# Patient Record
Sex: FEMALE | Race: WHITE | Employment: FULL TIME | URBAN - METROPOLITAN AREA
[De-identification: names, ages, dates, MRNs, and addresses within clinical notes are randomized per-mention and may not be internally consistent; named-entity substitution may affect disease eponyms.]

---

## 2020-08-18 ENCOUNTER — OFFICE VISIT (OUTPATIENT)
Dept: INTERNAL MEDICINE CLINIC | Facility: CLINIC | Age: 45
End: 2020-08-18
Payer: COMMERCIAL

## 2020-08-18 VITALS
TEMPERATURE: 98 F | HEART RATE: 72 BPM | SYSTOLIC BLOOD PRESSURE: 124 MMHG | WEIGHT: 236 LBS | BODY MASS INDEX: 39.32 KG/M2 | RESPIRATION RATE: 18 BRPM | HEIGHT: 65 IN | DIASTOLIC BLOOD PRESSURE: 80 MMHG | OXYGEN SATURATION: 98 %

## 2020-08-18 DIAGNOSIS — Z13.220 SCREENING FOR LIPID DISORDERS: ICD-10-CM

## 2020-08-18 DIAGNOSIS — Z12.31 ENCOUNTER FOR SCREENING MAMMOGRAM FOR MALIGNANT NEOPLASM OF BREAST: ICD-10-CM

## 2020-08-18 DIAGNOSIS — L03.032 PARONYCHIA OF TOE OF LEFT FOOT: Primary | ICD-10-CM

## 2020-08-18 DIAGNOSIS — Z13.228 SCREENING FOR METABOLIC DISORDER: ICD-10-CM

## 2020-08-18 DIAGNOSIS — Z13.0 SCREENING FOR BLOOD DISEASE: ICD-10-CM

## 2020-08-18 DIAGNOSIS — Z00.00 LABORATORY EXAMINATION ORDERED AS PART OF A COMPLETE PHYSICAL EXAMINATION: ICD-10-CM

## 2020-08-18 DIAGNOSIS — L03.032 CELLULITIS OF TOE OF LEFT FOOT: ICD-10-CM

## 2020-08-18 DIAGNOSIS — Z13.29 THYROID DISORDER SCREENING: ICD-10-CM

## 2020-08-18 PROCEDURE — 99203 OFFICE O/P NEW LOW 30 MIN: CPT | Performed by: INTERNAL MEDICINE

## 2020-08-18 PROCEDURE — 3079F DIAST BP 80-89 MM HG: CPT | Performed by: INTERNAL MEDICINE

## 2020-08-18 PROCEDURE — 3074F SYST BP LT 130 MM HG: CPT | Performed by: INTERNAL MEDICINE

## 2020-08-18 PROCEDURE — 3008F BODY MASS INDEX DOCD: CPT | Performed by: INTERNAL MEDICINE

## 2020-08-18 RX ORDER — CLINDAMYCIN HYDROCHLORIDE 300 MG/1
300 CAPSULE ORAL 4 TIMES DAILY
Qty: 28 CAPSULE | Refills: 0 | Status: SHIPPED | OUTPATIENT
Start: 2020-08-18 | End: 2020-08-25

## 2020-08-18 NOTE — PROGRESS NOTES
Geraldo Valdes  12/23/1975    Patient presents with:  Establish Care: [facility] rm 6 new pt, transfer from [facility]. Left big toe infection for months      SUBJECTIVE   Geraldo Valdes is a 38year old female who presents with a painful first left toe.     The patient note Encounter for long-term (current) use of other medications     Other pulmonary embolism and infarction     Past Surgical History:   Procedure Laterality Date   •  DELIVERY ONLY  2006   • CONIZATION CERVIX,LOOP ELECTRD  2003   • FOOT/TOES SURGER capsule 0     Sig: Take 1 capsule (300 mg total) by mouth 4 (four) times daily for 7 days. Imaging & Consults:  PODIATRY - INTERNAL    No follow-ups on file. There are no Patient Instructions on file for this visit.     All questions were answered an

## 2020-09-08 ENCOUNTER — OFFICE VISIT (OUTPATIENT)
Dept: PODIATRY CLINIC | Facility: CLINIC | Age: 45
End: 2020-09-08
Payer: COMMERCIAL

## 2020-09-08 DIAGNOSIS — L03.032 PARONYCHIA OF TOE OF LEFT FOOT: Primary | ICD-10-CM

## 2020-09-08 DIAGNOSIS — M79.675 PAIN OF TOE OF LEFT FOOT: ICD-10-CM

## 2020-09-08 DIAGNOSIS — L60.0 ONYCHOCRYPTOSIS: ICD-10-CM

## 2020-09-08 PROCEDURE — 99203 OFFICE O/P NEW LOW 30 MIN: CPT | Performed by: PODIATRIST

## 2020-09-09 RX ORDER — AZITHROMYCIN 250 MG/1
TABLET, FILM COATED ORAL
Qty: 6 TABLET | Refills: 0 | Status: SHIPPED | OUTPATIENT
Start: 2020-09-09 | End: 2020-09-14

## 2020-09-09 NOTE — PROGRESS NOTES
Geraldo Valdes is a 38year old female.  Patient presents with:  Ingrown Toenail: Left big toe - onset end of March - has drainage, has redness, has pain with touch rated as 3-10/10         HPI:   This pleasant patient was seen complaining of a painful ingro Spouse name: Not on file      Number of children: Not on file      Years of education: Not on file      Highest education level: Not on file    Tobacco Use      Smoking status: Never Smoker      Smokeless tobacco: Never Used    Substance and Sexual Activ do a phenol and alcohol procedure which was discussed with her at this visit. The patient indicates understanding of these issues and agrees to the plan.     Lilia Gurrola DPM

## 2020-09-14 ENCOUNTER — OFFICE VISIT (OUTPATIENT)
Dept: PODIATRY CLINIC | Facility: CLINIC | Age: 45
End: 2020-09-14
Payer: COMMERCIAL

## 2020-09-14 VITALS — SYSTOLIC BLOOD PRESSURE: 117 MMHG | RESPIRATION RATE: 16 BRPM | HEART RATE: 59 BPM | DIASTOLIC BLOOD PRESSURE: 78 MMHG

## 2020-09-14 DIAGNOSIS — M79.675 PAIN OF TOE OF LEFT FOOT: Primary | ICD-10-CM

## 2020-09-14 DIAGNOSIS — L60.0 ONYCHOCRYPTOSIS: ICD-10-CM

## 2020-09-14 DIAGNOSIS — L03.032 PARONYCHIA OF TOE OF LEFT FOOT: ICD-10-CM

## 2020-09-14 PROCEDURE — 3078F DIAST BP <80 MM HG: CPT | Performed by: PODIATRIST

## 2020-09-14 PROCEDURE — 3074F SYST BP LT 130 MM HG: CPT | Performed by: PODIATRIST

## 2020-09-14 PROCEDURE — 11750 EXCISION NAIL&NAIL MATRIX: CPT | Performed by: PODIATRIST

## 2020-09-14 NOTE — PROGRESS NOTES
Per Dr. Birdie Jin, draw up 5 mL of 0.5 % Marcaine for injection to left hallux. RR    Instruction sheet given for post procedure care for ingrown toenail. Patient left office prior to obtaining post procedure vitals.

## 2020-09-14 NOTE — PROGRESS NOTES
Dawson Rios is a 38year old female. Patient presents with:  Procedure: Left great toenail lateral border P&A -- Rates pain 5/10 right now. Drainage is cloudy. Finished abx yesterday.          HPI:   Patient returns the clinic for nail procedure in the le Tobacco Use      Smoking status: Never Smoker      Smokeless tobacco: Never Used    Substance and Sexual Activity      Alcohol use: No      Drug use: No    Other Topics      Concerns:        Caffeine Concern: No        Exercise: No        Seat Belt: Yes of alcohol. The affected toes were dressed with antibiotic ointment gauze and a lightly applied Coban wrap for compression.   Patient just finished her Z-Leonides therefore does not require another prescription and she will begin soaks in warm soapy water instr

## 2020-09-22 ENCOUNTER — OFFICE VISIT (OUTPATIENT)
Dept: PODIATRY CLINIC | Facility: CLINIC | Age: 45
End: 2020-09-22
Payer: COMMERCIAL

## 2020-09-22 DIAGNOSIS — Z98.890 POST-OPERATIVE STATE: Primary | ICD-10-CM

## 2020-09-22 PROCEDURE — 99024 POSTOP FOLLOW-UP VISIT: CPT | Performed by: PODIATRIST

## 2020-09-22 RX ORDER — AZITHROMYCIN 250 MG/1
TABLET, FILM COATED ORAL
Qty: 6 TABLET | Refills: 0 | Status: SHIPPED | OUTPATIENT
Start: 2020-09-22 | End: 2020-09-27

## 2020-09-22 NOTE — PROGRESS NOTES
Izaiah Rivera is a 38year old female. Patient presents with:  Procedure Follow Up: s/p left great toenail procedure -- Procedure was on 09/14/20. Rates pain 2-3/10 if bumped. Dranage is present. Denies any fever.          HPI:   Patient returns to the clin on file    Tobacco Use      Smoking status: Never Smoker      Smokeless tobacco: Never Used    Substance and Sexual Activity      Alcohol use: No      Drug use: No    Other Topics      Concerns:        Caffeine Concern: No        Exercise: No        Seat B

## 2020-09-29 ENCOUNTER — OFFICE VISIT (OUTPATIENT)
Dept: PODIATRY CLINIC | Facility: CLINIC | Age: 45
End: 2020-09-29
Payer: COMMERCIAL

## 2020-09-29 DIAGNOSIS — Z98.890 POST-OPERATIVE STATE: Primary | ICD-10-CM

## 2020-09-29 PROCEDURE — 99212 OFFICE O/P EST SF 10 MIN: CPT | Performed by: PODIATRIST

## 2020-09-29 NOTE — PROGRESS NOTES
Jerry Kat is a 38year old female.  Patient presents with:  Ingrown Toenail: Left big toe - 2 weeks f/u  after procedure - states she is better - has sometimes a littel drainage - has some pain rated as 1/10 sometimes         HPI:   Patient returns to t Other Topics      Concerns:        Caffeine Concern: No        Exercise: No        Seat Belt: Yes          REVIEW OF SYSTEMS:   Today reviewed systens as documented below  GENERAL HEALTH: feels well otherwise  SKIN: Refer to exam below  RESPIRATORY: denies

## 2020-10-13 ENCOUNTER — OFFICE VISIT (OUTPATIENT)
Dept: PODIATRY CLINIC | Facility: CLINIC | Age: 45
End: 2020-10-13
Payer: COMMERCIAL

## 2020-10-13 DIAGNOSIS — L60.0 ONYCHOCRYPTOSIS: Primary | ICD-10-CM

## 2020-10-13 PROCEDURE — 99212 OFFICE O/P EST SF 10 MIN: CPT | Performed by: PODIATRIST

## 2020-10-13 NOTE — PROGRESS NOTES
Sunita Carroll is a 38year old female. Patient presents with:  Procedure Follow Up: s/p left hallux toenail -- Procedure on 09/22/20. Rates pain 0/10. Toe has minimal drainage. HPI:   Patient returns to clinic doing well no pain.   Now approximately Activity      Alcohol use: No      Drug use: No    Other Topics      Concerns:        Caffeine Concern: No        Exercise: No        Seat Belt: Yes          REVIEW OF SYSTEMS:   Today reviewed systens as documented below  GENERAL HEALTH: feels well otherw

## 2020-10-30 ENCOUNTER — HOSPITAL ENCOUNTER (OUTPATIENT)
Age: 45
Discharge: HOME | End: 2020-10-30
Payer: COMMERCIAL

## 2020-10-30 ENCOUNTER — APPOINTMENT (OUTPATIENT)
Dept: GENERAL RADIOLOGY | Age: 45
End: 2020-10-30
Attending: PHYSICIAN ASSISTANT
Payer: COMMERCIAL

## 2020-10-30 ENCOUNTER — TELEPHONE (OUTPATIENT)
Dept: CASE MANAGEMENT | Facility: HOSPITAL | Age: 45
End: 2020-10-30

## 2020-10-30 VITALS
RESPIRATION RATE: 18 BRPM | DIASTOLIC BLOOD PRESSURE: 94 MMHG | BODY MASS INDEX: 40.97 KG/M2 | WEIGHT: 240 LBS | TEMPERATURE: 98 F | HEIGHT: 64 IN | OXYGEN SATURATION: 100 % | SYSTOLIC BLOOD PRESSURE: 150 MMHG | HEART RATE: 71 BPM

## 2020-10-30 DIAGNOSIS — R07.9 CHEST PAIN OF UNCERTAIN ETIOLOGY: ICD-10-CM

## 2020-10-30 DIAGNOSIS — R06.00 DYSPNEA, UNSPECIFIED TYPE: Primary | ICD-10-CM

## 2020-10-30 PROCEDURE — 93000 ELECTROCARDIOGRAM COMPLETE: CPT | Performed by: PHYSICIAN ASSISTANT

## 2020-10-30 PROCEDURE — 84484 ASSAY OF TROPONIN QUANT: CPT | Performed by: PHYSICIAN ASSISTANT

## 2020-10-30 PROCEDURE — 99204 OFFICE O/P NEW MOD 45 MIN: CPT | Performed by: PHYSICIAN ASSISTANT

## 2020-10-30 PROCEDURE — 71046 X-RAY EXAM CHEST 2 VIEWS: CPT | Performed by: PHYSICIAN ASSISTANT

## 2020-10-30 PROCEDURE — 94640 AIRWAY INHALATION TREATMENT: CPT | Performed by: PHYSICIAN ASSISTANT

## 2020-10-30 PROCEDURE — 36415 COLL VENOUS BLD VENIPUNCTURE: CPT | Performed by: PHYSICIAN ASSISTANT

## 2020-10-30 PROCEDURE — 85378 FIBRIN DEGRADE SEMIQUANT: CPT | Performed by: PHYSICIAN ASSISTANT

## 2020-10-30 RX ORDER — ALBUTEROL SULFATE 90 UG/1
8 AEROSOL, METERED RESPIRATORY (INHALATION) ONCE
Status: COMPLETED | OUTPATIENT
Start: 2020-10-30 | End: 2020-10-30

## 2020-10-30 NOTE — ED PROVIDER NOTES
Patient Seen in: [facility] [address]      History   Patient presents with:  Shortness Of Breath  Chest Pressure    Stated Complaint: shortness of breath, burning in chest x1 day    HPI    Min Varela is a 76-year-old female who presents today for e Smokeless tobacco: Never Used    Alcohol use: No    Drug use: No             Review of Systems    Positive for stated complaint: shortness of breath, burning in chest x1 day  Other systems are as noted in HPI. Constitutional and vital signs reviewed. initiated with a troponin and EKG.       Heart Score:    HEART Score      Title    Criteria Score   Age: 41-57 Age Score: 1   History: Slightly Suspicious Hx Score: 0   EKG: Non-Spec Changes EKG Score: 1   HTN: No   Hypercholesterolemia: No   Atherosclerosi ordered by ER nurse . MDM    Patient is advised of my physical exam findings. Patient is a low risk heart score, but given her family history and current symptoms, stress test is warranted.   This is set up by the ER case manage

## 2020-10-30 NOTE — ED INITIAL ASSESSMENT (HPI)
Patient states for the last 1-2 days she has had intermittent SOB, chest tightness/burning, bilateral ear pressure, nausea, sore throat, headaches, and some generalized body aches. Denies any fevers, chills, V/D, loss of taste or smell, or other symptoms.

## 2020-10-30 NOTE — ED PROVIDER NOTES
68-year-old female who was seen by one of our physician assistants at an outside facility for atypical chest pain. There is a component of dyspnea with it as well.   Patient's work-up was appropriate including an EKG that I personally looked at she has an

## 2021-02-16 ENCOUNTER — PATIENT MESSAGE (OUTPATIENT)
Dept: PODIATRY CLINIC | Facility: CLINIC | Age: 46
End: 2021-02-16

## 2021-02-16 NOTE — TELEPHONE ENCOUNTER
From: Latoya Reed  To: Cisco Harris DPM  Sent: 2/16/2021 4:25 PM CST  Subject: Visit Follow-up Question    Hi Dr. Blanca Maguire, my toe was doing great until 2 days ago. It got a little red and tender and now today I notice some discharge.  When I saw you

## 2021-03-09 ENCOUNTER — OFFICE VISIT (OUTPATIENT)
Dept: PODIATRY CLINIC | Facility: CLINIC | Age: 46
End: 2021-03-09
Payer: COMMERCIAL

## 2021-03-09 DIAGNOSIS — L03.032 PARONYCHIA OF FIFTH TOE OF LEFT FOOT: Primary | ICD-10-CM

## 2021-03-09 PROCEDURE — 99213 OFFICE O/P EST LOW 20 MIN: CPT | Performed by: PODIATRIST

## 2021-03-09 RX ORDER — DOXYCYCLINE HYCLATE 100 MG/1
100 CAPSULE ORAL 2 TIMES DAILY
Qty: 20 CAPSULE | Refills: 0 | Status: SHIPPED | OUTPATIENT
Start: 2021-03-09

## 2021-03-09 NOTE — PROGRESS NOTES
Morene Kawasaki is a 54year old female. Patient presents with: Toe Pain: left -- Toe has redness and pain on lateral border of toenail. Rates pain 1/10. Soaking in dish soap, applying Mupirocin ointment and bandaid. No oral abx.  Denies any drainage at this History      Marital status:       Spouse name: Not on file      Number of children: Not on file      Years of education: Not on file      Highest education level: Not on file    Tobacco Use      Smoking status: Never Smoker      Smokeless tobacco: any purulence I did not see any dried crust or blood. 2. Vascular: Patient has palpable pulses   3. Neurologic: Patient has intact sensorium there is no deficits noted   4. Musculoskeletal: Patient has good muscle strength.     ASSESSMENT AND PLAN:   Fern

## 2021-03-12 DIAGNOSIS — Z23 NEED FOR VACCINATION: ICD-10-CM

## 2021-04-05 ENCOUNTER — OFFICE VISIT (OUTPATIENT)
Dept: PODIATRY CLINIC | Facility: CLINIC | Age: 46
End: 2021-04-05
Payer: COMMERCIAL

## 2021-04-05 DIAGNOSIS — L03.032 PARONYCHIA OF FIFTH TOE OF LEFT FOOT: Primary | ICD-10-CM

## 2021-04-05 PROCEDURE — 99212 OFFICE O/P EST SF 10 MIN: CPT | Performed by: PODIATRIST

## 2021-04-06 NOTE — PROGRESS NOTES
Shelley Hernández is a 54year old female. Patient presents with:   Infection: Left big toe f/u - states the infection in gone - has no pain and no drainage         HPI:   Patient returns the clinic she is got irritation at the lateral nail border left hallux a Used    Vaping Use      Vaping Use: Never used    Substance and Sexual Activity      Alcohol use: No      Drug use: No    Other Topics      Concerns:        Caffeine Concern: No        Exercise: No        Seat Belt: Yes    Social Determinants of Health  Fi Musculoskeletal: Patient has good muscle strength. ASSESSMENT AND PLAN:   Diagnoses and all orders for this visit:    Paronychia of fifth toe of left foot        Plan: Today recommended revision phenol and alcohol procedure.   We will get her scheduled f

## 2022-06-14 ENCOUNTER — HOSPITAL ENCOUNTER (EMERGENCY)
Facility: HOSPITAL | Age: 47
Discharge: HOME | End: 2022-06-14
Attending: EMERGENCY MEDICINE
Payer: COMMERCIAL

## 2022-06-14 ENCOUNTER — APPOINTMENT (OUTPATIENT)
Dept: GENERAL RADIOLOGY | Facility: HOSPITAL | Age: 47
End: 2022-06-14
Attending: EMERGENCY MEDICINE
Payer: COMMERCIAL

## 2022-06-14 VITALS
WEIGHT: 250 LBS | HEIGHT: 66 IN | TEMPERATURE: 98 F | OXYGEN SATURATION: 100 % | BODY MASS INDEX: 40.18 KG/M2 | HEART RATE: 92 BPM | RESPIRATION RATE: 20 BRPM

## 2022-06-14 DIAGNOSIS — S16.1XXA STRAIN OF NECK MUSCLE, INITIAL ENCOUNTER: Primary | ICD-10-CM

## 2022-06-14 PROCEDURE — 72050 X-RAY EXAM NECK SPINE 4/5VWS: CPT | Performed by: EMERGENCY MEDICINE

## 2022-06-14 PROCEDURE — 99284 EMERGENCY DEPT VISIT MOD MDM: CPT

## 2022-06-14 PROCEDURE — 70355 PANORAMIC X-RAY OF JAWS: CPT | Performed by: EMERGENCY MEDICINE

## 2022-06-14 PROCEDURE — 99283 EMERGENCY DEPT VISIT LOW MDM: CPT

## 2022-06-14 RX ORDER — IBUPROFEN 400 MG/1
400 TABLET ORAL ONCE
Status: COMPLETED | OUTPATIENT
Start: 2022-06-14 | End: 2022-06-14

## 2022-06-14 RX ORDER — DIAZEPAM 5 MG/1
5 TABLET ORAL ONCE
Status: COMPLETED | OUTPATIENT
Start: 2022-06-14 | End: 2022-06-14

## 2022-06-14 RX ORDER — CYCLOBENZAPRINE HCL 5 MG
5 TABLET ORAL 3 TIMES DAILY PRN
Qty: 12 TABLET | Refills: 0 | Status: SHIPPED | OUTPATIENT
Start: 2022-06-14
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
## (undated) NOTE — LETTER
AUTHORIZATION FOR SURGICAL OPERATION OR OTHER PROCEDURE    1.  I hereby authorize Dr. Herbert Win, and [facility] staff assigned to my case to perform the following operation and/or procedure at the [facility]:    Phenol and alcohol procedure of Time:  ________ A. M.  P.M.        Patient Name:  ______________________________________________________  (please print)      Patient signature:  ___________________________________________________             Relationship to Patient:

## (undated) NOTE — LETTER
11/03/20        [address]  [address] [id number]      Dear Miguel Ledesma,    [address] [facility] records indicate that you have outstanding lab work and or testing that was ordered for you and has not yet been completed:  Orders Placed This Encounter